# Patient Record
Sex: FEMALE | Race: WHITE | URBAN - METROPOLITAN AREA
[De-identification: names, ages, dates, MRNs, and addresses within clinical notes are randomized per-mention and may not be internally consistent; named-entity substitution may affect disease eponyms.]

---

## 2017-05-24 ENCOUNTER — HISTORICAL (OUTPATIENT)
Dept: ADMINISTRATIVE | Facility: HOSPITAL | Age: 57
End: 2017-05-24

## 2021-10-04 ENCOUNTER — HISTORICAL (OUTPATIENT)
Dept: ADMINISTRATIVE | Facility: HOSPITAL | Age: 61
End: 2021-10-04

## 2022-04-29 VITALS
SYSTOLIC BLOOD PRESSURE: 125 MMHG | BODY MASS INDEX: 35.08 KG/M2 | HEIGHT: 63 IN | SYSTOLIC BLOOD PRESSURE: 128 MMHG | WEIGHT: 199.06 LBS | HEIGHT: 63 IN | BODY MASS INDEX: 35.27 KG/M2 | DIASTOLIC BLOOD PRESSURE: 80 MMHG | DIASTOLIC BLOOD PRESSURE: 78 MMHG | WEIGHT: 198 LBS

## 2022-05-03 NOTE — HISTORICAL [facility]
This is a historical note converted from [employer]. Formatting and pictures may have been removed.  Please reference [facility] for original formatting and attached multimedia. Chief Complaint  1 YEAR S/P RIGHT PARTIAL KNEE REPLACEMENT. DOING VERY WELL.  History of Present Illness  1 year status post right partial knee arthroplasty  She is doing great, no complaints or issues with her right knee  Review of Systems  Systemic: No fever, no chills, and no recent weight change.  Head: No headache - frequent.  Eyes: No vision problems.  Otolarnygeal: No hearing loss, no earache, no epistaxis, no hoarseness, and no tooth pain. Gums normal.  Cardiovascular: No chest pain or discomfort and no palpitations.  Pulmonary: No pulmonary symptoms - difficulty sleeping, no dyspnea, and cough not worse in the morning.  Gastrointestinal: Appetite not decreased. No dysphagia and no constant eructation. No nausea, no vomiting, no abdominal pain, no hematochezia, and no loose/mushy stools - frequent. No constipation - frequent.  Genitourinary: No genitourinary symptoms - Getting up every night to urinate and no increase in urinary frequency. No urinary hesitancy. No urinary loss of control - difficulty stopping urination and no burning sensation during urination.  Musculoskeletal: No calf muscle cramps and no localized soft tissue swelling of the ankle.  Neurological: No fainting and no convulsions.  Psychological: Not feeling nervous tension, not feeling nervous from exhaustion, and no depression.  Skin: No rash. Previous history of no ulcers.  ?  ?  Physical Exam  Vitals & Measurements  BP:?128/80? HT:?160?cm? HT:?160?cm? WT:?90.3?kg? WT:?90.3?kg? BMI:?35.27?  Cardiovascular:  Arterial Pulses: ? Dorsalis pedis pulses were normal.  Musculoskeletal System:  Right Knee:  General: ? No swelling of the knee. ? No warmth of the knee. ? No pain was elicited by motion of the knee. ? No instability of the knee. ? Knees demonstrated no muscle  weakness.  Right Knee: ? Motion was abnormal.  Active flexion 130 degrees  Active extension 0 degrees  Neurological:  Sensation: ? Monofilament wire test of the leg/foot was normal.  Motor (Strength): ? No weakness of the ?Right knee was observed.  Skin:  ? No cellulitis. Surgical incision well healed ?  Tests  Imaging:  X-Ray Knee:  A complete knee x-ray with standing views was performed -of Right ?knee.  Impressions Radiology Test  X-ray of knee was performed intact Right knee implant?with pristine interfaces.  ?  ?  Assessment/Plan  Status post right partial knee replacement  ?She will continue with maintenance knee exercises as tolerated and follow up with us as needed.?Dr. Magana has examined patient agrees with plan   Problem List/Past Medical History  Anxiety  Arthritis  Colon polyps  Hypertension  Obesity  Status post right partial knee replacement  Historical  Anxiety  HTN (hypertension)  Procedure/Surgical History  ANSLEY BARCENAS Partial Knee Arthroplasty (Right) (05/31/2016), Replacement of Right Knee Joint with Synthetic Substitute, Cemented, Open Approach (05/31/2016), Robotic Assisted Procedure of Lower Extremity, Open Approach (05/31/2016), adenoidectomy, appendectomy, bilateral carpal tunnel sx, bunionectomy on rt, colonoscopy, cone biopsy of cervix, ORIF - Open reduction of fracture of ankle with internal fixation, tonsillectomy, Wrist.  Medications  clonazePAM 0.5 mg oral tablet  lisinopril 5 mg oral tablet, 5 mg, 1 tab(s), Oral, Daily  Multiple Vitamins oral tab, 1 tab(s), Oral, Daily  penicillin V potassium 500 mg oral tablet, See Instructions,? ?Not taking  Vitamin D 1,000 Units Tab, 1 tab(s), Oral, Daily  Allergies  No Known Allergies  Social History  Alcohol  Employment/School  Employed  Home/Environment  Lives with Spouse.  Substance Abuse - Denies Substance Abuse  Never  Tobacco - Denies Tobacco Use  Never smoker  Family History  Alcoholism.: Father.  COPD (chronic obstructive pulmonary disease).:  Father.  Depression.: Mother.  Diabetes mellitus type 1.: Mother.  Hyperlipidemia.: Mother.  Hypertension.: Mother.  Stroke: Mother.

## 2025-06-30 ENCOUNTER — OFFICE VISIT (OUTPATIENT)
Dept: ORTHOPEDICS | Facility: CLINIC | Age: 65
End: 2025-06-30
Payer: COMMERCIAL

## 2025-06-30 ENCOUNTER — HOSPITAL ENCOUNTER (OUTPATIENT)
Dept: RADIOLOGY | Facility: CLINIC | Age: 65
Discharge: HOME | End: 2025-06-30
Attending: PHYSICIAN ASSISTANT
Payer: COMMERCIAL

## 2025-06-30 VITALS — HEIGHT: 62 IN | WEIGHT: 213 LBS | BODY MASS INDEX: 39.2 KG/M2

## 2025-06-30 DIAGNOSIS — M25.551 BILATERAL HIP PAIN: ICD-10-CM

## 2025-06-30 DIAGNOSIS — M25.552 BILATERAL HIP PAIN: ICD-10-CM

## 2025-06-30 DIAGNOSIS — M16.11 PRIMARY OSTEOARTHRITIS OF RIGHT HIP: Primary | ICD-10-CM

## 2025-06-30 PROCEDURE — 3044F HG A1C LEVEL LT 7.0%: CPT | Mod: CPTII,,, | Performed by: PHYSICIAN ASSISTANT

## 2025-06-30 PROCEDURE — 1160F RVW MEDS BY RX/DR IN RCRD: CPT | Mod: CPTII,,, | Performed by: PHYSICIAN ASSISTANT

## 2025-06-30 PROCEDURE — 4010F ACE/ARB THERAPY RXD/TAKEN: CPT | Mod: CPTII,,, | Performed by: PHYSICIAN ASSISTANT

## 2025-06-30 PROCEDURE — 99202 OFFICE O/P NEW SF 15 MIN: CPT | Mod: ,,, | Performed by: PHYSICIAN ASSISTANT

## 2025-06-30 PROCEDURE — 3008F BODY MASS INDEX DOCD: CPT | Mod: CPTII,,, | Performed by: PHYSICIAN ASSISTANT

## 2025-06-30 PROCEDURE — 73521 X-RAY EXAM HIPS BI 2 VIEWS: CPT | Mod: ,,, | Performed by: PHYSICIAN ASSISTANT

## 2025-06-30 PROCEDURE — 1159F MED LIST DOCD IN RCRD: CPT | Mod: CPTII,,, | Performed by: PHYSICIAN ASSISTANT

## 2025-06-30 RX ORDER — PRAVASTATIN SODIUM 40 MG/1
40 TABLET ORAL DAILY
COMMUNITY
Start: 2021-10-04

## 2025-06-30 RX ORDER — ONDANSETRON 8 MG/1
8 TABLET, ORALLY DISINTEGRATING ORAL EVERY 8 HOURS PRN
COMMUNITY
Start: 2024-09-09

## 2025-06-30 RX ORDER — MELOXICAM 7.5 MG/1
7.5 TABLET ORAL NIGHTLY PRN
COMMUNITY

## 2025-06-30 RX ORDER — PANTOPRAZOLE SODIUM 40 MG/1
40 TABLET, DELAYED RELEASE ORAL DAILY
COMMUNITY
Start: 2025-04-15

## 2025-06-30 RX ORDER — DICLOFENAC SODIUM 75 MG/1
75 TABLET, DELAYED RELEASE ORAL 2 TIMES DAILY
Qty: 60 TABLET | Refills: 0 | Status: SHIPPED | OUTPATIENT
Start: 2025-06-30 | End: 2025-07-30

## 2025-06-30 RX ORDER — BISACODYL 5 MG/1
1 TABLET, COATED ORAL DAILY
COMMUNITY

## 2025-06-30 RX ORDER — CLONAZEPAM 0.5 MG/1
0.5 TABLET ORAL
COMMUNITY
Start: 2024-12-16

## 2025-06-30 RX ORDER — LISINOPRIL 10 MG/1
10 TABLET ORAL DAILY
COMMUNITY
Start: 2025-02-17

## 2025-06-30 RX ORDER — CYCLOBENZAPRINE HCL 10 MG
10 TABLET ORAL NIGHTLY PRN
COMMUNITY
Start: 2025-06-25

## 2025-06-30 NOTE — PROGRESS NOTES
Chief Complaint:   Chief Complaint   Patient presents with    Right Hip - Pain     Increase in bilateral hip pain x3-4 months. Denies prior injury. Pain worse with ROM. Taking Mobic and Aleve for pain with no relief. No prior sx. Previously had left hip inj to left inj. With relief. Not currently in PT.        History of present illness:      History of Present Illness    CHIEF COMPLAINT:  - Ms. Appiah presents for initial evaluation of bilateral hip pain.    HPI:  Ms. Appiah presents with bilateral hip pain that has been bothering her intermittently for years. Pain significantly worsened during a trip to [place] in October, where she had difficulty navigating stairs. Upon returning home, she continued her daily 30-minute treadmill walks but eventually had to stop due to severe pain. She reports scheduling an appointment in March or April but had to wait six weeks to be seen.    She reports that neither hip is worse than the other and states that nothing alleviates the pain. 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 In 2021, she received a cortisone injection for her hip, which was beneficial as she was walking excessively at the mall.    On May 26th, she developed a shoulder problem, for which she consulted her GP. The GP diagnosed it as muscle-related and prescribed double Meloxicam for three weeks, along with a steroid injection and a pain shot, which provided temporary relief. However, she reports her arm still lacks strength.    She had a partial knee replacement in 2016. She and her  have upcoming travel plans, including a cruise in October and a trip to [place] in October 2026.    PREVIOUS TREATMENTS:  - Ms. Appiah received a steroid injection and a pain shot from their GP for shoulder problem on May 26, which provided relief for about a day.  - Ms. Appiah received a cortisone injection in 2021 for hip pain, which allowed the patient to walk at the mall.  - Ms. Appiah  doubled Meloxicam dosage for 3 weeks as prescribed by GP, which provided significant benefit.    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 No pertinent past medical history.    Past Surgical History:   Procedure Laterality Date    ADENOIDECTOMY  1968    APPENDECTOMY  1969    BUNIONECTOMY      JOINT REPLACEMENT  2016    SURGICAL REMOVAL OF BONE SPUR      TONSILLECTOMY  1968       Current Outpatient Medications   Medication Sig    clonazePAM (KLONOPIN) 0.5 MG tablet Take 0.5 mg by mouth 2 (two) times daily.    cyclobenzaprine (FLEXERIL) 10 MG tablet Take 10 mg by mouth nightly as needed.    lisinopriL 10 MG tablet Take 10 mg by mouth.    meloxicam (MOBIC) 7.5 MG tablet Take 7.5 mg by mouth nightly as needed.    multivitamin-minerals-lutein (MULTIVITAMIN 50 PLUS) Tab Take by mouth.    ondansetron (ZOFRAN-ODT) 8 MG TbDL Take 8 mg by mouth every 8 (eight) hours as needed.    pantoprazole (PROTONIX) 40 MG tablet Take 40 mg by mouth.    pravastatin (PRAVACHOL) 40 MG tablet Take 40 mg by mouth.    vitamin D3-folic acid 125 mcg (5,000 unit)-1 mg Tab Take 1 tablet by mouth.    diclofenac (VOLTAREN) 75 MG EC tablet Take 1 tablet (75 mg total) by mouth 2 (two) times daily.     No current facility-administered medications for this visit.       Review of patient's allergies indicates:  No Known Allergies    Family History   Problem Relation Name Age of Onset    Depression Mother Marlen Miller     Diabetes Mother Marlen Miller     Hypertension Mother Marlen Miller     Stroke Mother Marlen Miller     Alcohol abuse Father Sophy Miller     Drug abuse Brother Little Brother     Stroke Brother Big Brother        Social History[1]      Review of  "Systems:    Constitution:   Denies chills, fever, and sweats.  HENT:   Denies headaches or blurry vision.  Cardiovascular:  Denies chest pain or irregular heart beat.  Respiratory:   Denies cough or shortness of breath.  Gastrointestinal:  Denies abdominal pain, nausea, or vomiting.  Musculoskeletal:   Denies muscle cramps.  Neurological:   Denies dizziness or focal weakness.  Psychiatric/Behavior: Normal mental status.  Hematology/Lymph:  Denies bleeding problem or easy bruising/bleeding.  Skin:    Denies rash or suspicious lesions.    Examination:    Vital Signs:    Vitals:    06/30/25 1451   Weight: 96.6 kg (213 lb)   Height: 5' 2" (1.575 m)       Body mass index is 38.96 kg/m².    Constitution:   Well-developed, well nourished patient in no acute distress.  Neurological:   Alert and oriented x 3 and cooperative to examination.     Psychiatric/Behavior: Normal mental status.  Respiratory:   No shortness of breath.  Nonlabored breathing  Cardiovascular:           Regular rate and rhythm  Eyes:    Extraoccular muscles intact  Skin:    No scars, rash or suspicious lesions.    Physical Exam:     Right hip exam     No signs of edema, erythema, or induration.    Tender over the greater trochanteric region.    Pain with internal and external rotation    Passive hip abduction 30 degrees   Passive hip flexion 90 degrees   Passive internal rotation 10 degrees   Passive external rotation 45 degrees   No weakness of the left hip was observed. An antalgic gait was observed. limping was noted     xrays    Right hip x-ray with two or more views of the AP and lateral aspects were performed.   Impressions   Advanced Joint space narrowing with osteophytes arising from the hip joint and subchondral cysts seen.  She does have mild degenerative changes seen in the left hip joint.        Assessment:     Primary osteoarthritis of right hip  -     X-Ray Hips Bilateral 2 View Inc AP Pelvis; Future; Expected date: 06/30/2025    Other " orders  -     diclofenac (VOLTAREN) 75 MG EC tablet; Take 1 tablet (75 mg total) by mouth 2 (two) times daily.  Dispense: 60 tablet; Refill: 0        Plan:      Assessment & Plan    PROCEDURES:  I have discussed exam and x-ray findings with the patient today.  She has advanced osteoarthritis of the right hip.  Conservative treatment would consist of fluoroscopic guided intra-articular corticosteroid injection, physical therapy, activity modification and medication.  Definitive treatment would consist of robotic assisted right total hip arthroplasty.  Currently she is interested in surgical intervention and would like to get this done prior to a cruise to [place] in October.  She would like to be recovered by that timeframe.  I have told her that Dr. Magana schedule would not allow that but I can speak to 1 of his partners to see if there is availability on their surgery schedule to accommodate her timeframe.  If not then we have discussed fluoroscopic guided right hip intra-articular corticosteroid injection with no sedation for temporary pain relief and she can look at scheduling the operation following her trip to [place] in October.  - Recommend PT for conservative treatment to strengthen muscles and stretch hip capsule.              No follow-ups on file.    This note was generated with the assistance of ambient listening technology. Verbal consent was obtained by the patient and accompanying visitor(s) for the recording of patient appointment to facilitate this note. I attest to having reviewed and edited the generated note for accuracy, though some syntax or spelling errors may persist. Please contact the author of this note for any clarification.       DISCLAIMER: This note may have been dictated using voice recognition software and may contain grammatical errors.          [1]   Social History  Socioeconomic History    Marital status:    Tobacco Use    Smoking status: Never    Smokeless tobacco: Never    Substance and Sexual Activity    Alcohol use: Not Currently     Comment: Rarely    Drug use: Never    Sexual activity: Not Currently     Partners: Male     Birth control/protection: Post-menopausal     Social Drivers of Health     Financial Resource Strain: Low Risk  (3/7/2025)    [facility]    Overall Financial Resource Strain (CARDIA)     Difficulty of Paying Living Expenses: Not hard at all   Food Insecurity: No Food Insecurity (3/7/2025)    [facility]    Hunger Vital Sign     Worried About Running Out of Food in the Last Year: Never true     Ran Out of Food in the Last Year: Never true   Transportation Needs: No Transportation Needs (3/7/2025)    [facility]    PRAPARE - Transportation     Lack of Transportation (Medical): No     Lack of Transportation (Non-Medical): No   Physical Activity: Sufficiently Active (3/7/2025)    [facility]    Exercise Vital Sign     Days of Exercise per Week: 5 days     Minutes of Exercise per Session: 30 min   Stress: Stress Concern Present (3/7/2025)    Received from [facility] and Affiliates    [employer] [place] [employer] - Occupational Stress Questionnaire     Feeling of Stress : Rather much   Housing Stability: Low Risk  (3/7/2025)    Received from [facility] and Affiliates    Housing Stability Vital Sign     Unable to Pay for Housing in the Last Year: No     Number of Times Moved in the Last Year: 0     Homeless in the Last Year: No

## 2025-06-30 NOTE — H&P (VIEW-ONLY)
Chief Complaint:   Chief Complaint   Patient presents with    Right Hip - Pain     Increase in bilateral hip pain x3-4 months. Denies prior injury. Pain worse with ROM. Taking Mobic and Aleve for pain with no relief. No prior sx. Previously had left hip inj to left inj. With relief. Not currently in PT.        History of present illness:      History of Present Illness    CHIEF COMPLAINT:  - Ms. Appiah presents for initial evaluation of bilateral hip pain.    HPI:  Ms. Appiah presents with bilateral hip pain that has been bothering her intermittently for years. Pain significantly worsened during a trip to [place] in October, where she had difficulty navigating stairs. Upon returning home, she continued her daily 30-minute treadmill walks but eventually had to stop due to severe pain. She reports scheduling an appointment in March or April but had to wait six weeks to be seen.    She reports that neither hip is worse than the other and states that nothing alleviates the pain. 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 In 2021, she received a cortisone injection for her hip, which was beneficial as she was [place] the mall.    On May 26th, she developed a shoulder problem, for which she consulted her GP. The GP diagnosed it as muscle-related and prescribed double Meloxicam for three weeks, along with a steroid injection and a pain shot, which provided temporary relief. However, she reports her arm still lacks strength.    She had a partial knee replacement in 2016. She and her  have upcoming travel plans, including a cruise in October and a trip to [place] in October 2026.    PREVIOUS TREATMENTS:  - Ms. Appiah received a steroid injection and a pain shot from their GP for shoulder problem on May 26, which provided relief for about a day.  - Ms. Appiah received a cortisone injection in 2021 for hip pain, which allowed the patient to walk at the mall.  - Ms. Appiah  doubled Meloxicam dosage for 3 weeks as prescribed by GP, which provided significant benefit.    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 No pertinent past medical history.    Past Surgical History:   Procedure Laterality Date    ADENOIDECTOMY  1968    APPENDECTOMY  1969    BUNIONECTOMY      JOINT REPLACEMENT  2016    SURGICAL REMOVAL OF BONE SPUR      TONSILLECTOMY  1968       Current Outpatient Medications   Medication Sig    clonazePAM (KLONOPIN) 0.5 MG tablet Take 0.5 mg by mouth 2 (two) times daily.    cyclobenzaprine (FLEXERIL) 10 MG tablet Take 10 mg by mouth nightly as needed.    lisinopriL 10 MG tablet Take 10 mg by mouth.    meloxicam (MOBIC) 7.5 MG tablet Take 7.5 mg by mouth nightly as needed.    multivitamin-minerals-lutein (MULTIVITAMIN 50 PLUS) Tab Take by mouth.    ondansetron (ZOFRAN-ODT) 8 MG TbDL Take 8 mg by mouth every 8 (eight) hours as needed.    pantoprazole (PROTONIX) 40 MG tablet Take 40 mg by mouth.    pravastatin (PRAVACHOL) 40 MG tablet Take 40 mg by mouth.    vitamin D3-folic acid 125 mcg (5,000 unit)-1 mg Tab Take 1 tablet by mouth.    diclofenac (VOLTAREN) 75 MG EC tablet Take 1 tablet (75 mg total) by mouth 2 (two) times daily.     No current facility-administered medications for this visit.       Review of patient's allergies indicates:  No Known Allergies    Family History   Problem Relation Name Age of Onset    Depression Mother Marlen Miller     Diabetes Mother Marlen Miller     Hypertension Mother Marlen Miller     Stroke Mother Marlen Miller     Alcohol abuse Father Sophy Miller     Drug abuse Brother Little Brother     Stroke Brother Big Brother        Social History[1]      Review of  "Systems:    Constitution:   Denies chills, fever, and sweats.  HENT:   Denies headaches or blurry vision.  Cardiovascular:  Denies chest pain or irregular heart beat.  Respiratory:   Denies cough or shortness of breath.  Gastrointestinal:  Denies abdominal pain, nausea, or vomiting.  Musculoskeletal:   Denies muscle cramps.  Neurological:   Denies dizziness or focal weakness.  Psychiatric/Behavior: Normal mental status.  Hematology/Lymph:  Denies bleeding problem or easy bruising/bleeding.  Skin:    Denies rash or suspicious lesions.    Examination:    Vital Signs:    Vitals:    06/30/25 1451   Weight: 96.6 kg (213 lb)   Height: 5' 2" (1.575 m)       Body mass index is 38.96 kg/m².    Constitution:   Well-developed, well nourished patient in no acute distress.  Neurological:   Alert and oriented x 3 and cooperative to examination.     Psychiatric/Behavior: Normal mental status.  Respiratory:   No shortness of breath.  Nonlabored breathing  Cardiovascular:           Regular rate and rhythm  Eyes:    Extraoccular muscles intact  Skin:    No scars, rash or suspicious lesions.    Physical Exam:     Right hip exam     No signs of edema, erythema, or induration.    Tender over the greater trochanteric region.    Pain with internal and external rotation    Passive hip abduction 30 degrees   Passive hip flexion 90 degrees   Passive internal rotation 10 degrees   Passive external rotation 45 degrees   No weakness of the left hip was observed. An antalgic gait was observed. limping was noted     xrays    Right hip x-ray with two or more views of the AP and lateral aspects were performed.   Impressions   Advanced Joint space narrowing with osteophytes arising from the hip joint and subchondral cysts seen.  She does have mild degenerative changes seen in the left hip joint.        Assessment:     Primary osteoarthritis of right hip  -     X-Ray Hips Bilateral 2 View Inc AP Pelvis; Future; Expected date: 06/30/2025    Other " orders  -     diclofenac (VOLTAREN) 75 MG EC tablet; Take 1 tablet (75 mg total) by mouth 2 (two) times daily.  Dispense: 60 tablet; Refill: 0        Plan:      Assessment & Plan    PROCEDURES:  I have discussed exam and x-ray findings with the patient today.  She has advanced osteoarthritis of the right hip.  Conservative treatment would consist of fluoroscopic guided intra-articular corticosteroid injection, physical therapy, activity modification and medication.  Definitive treatment would consist of robotic assisted right total hip arthroplasty.  Currently she is interested in surgical intervention and would like to get this done prior to a cruise to [place] in October.  She would like to be recovered by that timeframe.  I have told her that Dr. Magana schedule would not allow that but I can speak to 1 of his partners to see if there is availability on their surgery schedule to accommodate her timeframe.  If not then we have discussed fluoroscopic guided right hip intra-articular corticosteroid injection with no sedation for temporary pain relief and she can look at scheduling the operation following her trip to [place] in October.  - Recommend PT for conservative treatment to strengthen muscles and stretch hip capsule.              No follow-ups on file.    This note was generated with the assistance of ambient listening technology. Verbal consent was obtained by the patient and accompanying visitor(s) for the recording of patient appointment to facilitate this note. I attest to having reviewed and edited the generated note for accuracy, though some syntax or spelling errors may persist. Please contact the author of this note for any clarification.       DISCLAIMER: This note may have been dictated using voice recognition software and may contain grammatical errors.          [1]   Social History  Socioeconomic History    Marital status:    Tobacco Use    Smoking status: Never    Smokeless tobacco: Never    Substance and Sexual Activity    Alcohol use: Not Currently     Comment: Rarely    Drug use: Never    Sexual activity: Not Currently     Partners: Male     Birth control/protection: Post-menopausal     Social Drivers of Health     Financial Resource Strain: Low Risk  (3/7/2025)    [facility]    Overall Financial Resource Strain (CARDIA)     Difficulty of Paying Living Expenses: Not hard at all   Food Insecurity: No Food Insecurity (3/7/2025)    [facility]    Hunger Vital Sign     Worried About Running Out of Food in the Last Year: Never true     Ran Out of Food in the Last Year: Never true   Transportation Needs: No Transportation Needs (3/7/2025)    [facility]    PRAPARE - Transportation     Lack of Transportation (Medical): No     Lack of Transportation (Non-Medical): No   Physical Activity: Sufficiently Active (3/7/2025)    [facility]    Exercise Vital Sign     Days of Exercise per Week: 5 days     Minutes of Exercise per Session: 30 min   Stress: Stress Concern Present (3/7/2025)    Received from [facility] and Its Sub[facility]idiaries and Affiliates    [employer] [place] [employer] - Occupational Stress Questionnaire     Feeling of Stress : Rather much   Housing Stability: Low Risk  (3/7/2025)    Received from [facility] and Affiliates    Housing Stability Vital Sign     Unable to Pay for Housing in the Last Year: No     Number of Times Moved in the Last Year: 0     Homeless in the Last Year: No

## 2025-07-01 ENCOUNTER — TELEPHONE (OUTPATIENT)
Dept: ORTHOPEDICS | Facility: CLINIC | Age: 65
End: 2025-07-01
Payer: COMMERCIAL

## 2025-07-01 NOTE — TELEPHONE ENCOUNTER
Called pt per [facility] to inform her that Dr. Carr's sx schedule is booking into late August, so this will not give her enough time to recover before her trip to [place] in October.     Spoke with pt and inquired on if she would like to proceed with RT hip IA inj to see if this will help with her pain.     RT hip IA inj NO sed sx scheduled for 7/15/25 and informed pt that consents will be signed the morning of sx. Also informed that they will call her the day before sx for time of arrival.     Informed that if this does help with her pain, we can schedule another inj prior to her trip in October and then when she gets back, we can get her on the books for RT ANTWAN.     Informed to call with any questions or concerns, she verbalized a clear understanding.

## 2025-07-15 ENCOUNTER — HOSPITAL ENCOUNTER (OUTPATIENT)
Facility: HOSPITAL | Age: 65
Discharge: HOME | End: 2025-07-15
Attending: SPECIALIST | Admitting: SPECIALIST
Payer: COMMERCIAL

## 2025-07-15 DIAGNOSIS — M16.11 PRIMARY OSTEOARTHRITIS OF RIGHT HIP: ICD-10-CM

## 2025-07-15 PROCEDURE — 71000015 HC POSTOP RECOV 1ST HR: Performed by: SPECIALIST

## 2025-07-15 PROCEDURE — 63600175 PHARM REV CODE 636 W HCPCS: Performed by: SPECIALIST

## 2025-07-15 PROCEDURE — 20610 DRAIN/INJ JOINT/BURSA W/O US: CPT | Mod: RT,,, | Performed by: SPECIALIST

## 2025-07-15 PROCEDURE — 36000704 HC OR TIME LEV I 1ST 15 MIN: Performed by: SPECIALIST

## 2025-07-15 PROCEDURE — 77002 NEEDLE LOCALIZATION BY XRAY: CPT | Mod: 26,,, | Performed by: SPECIALIST

## 2025-07-15 PROCEDURE — 20610 DRAIN/INJ JOINT/BURSA W/O US: CPT | Performed by: SPECIALIST

## 2025-07-15 RX ORDER — BETAMETHASONE SODIUM PHOSPHATE AND BETAMETHASONE ACETATE 3; 3 MG/ML; MG/ML
INJECTION, SUSPENSION INTRA-ARTICULAR; INTRALESIONAL; INTRAMUSCULAR; SOFT TISSUE
Status: DISCONTINUED
Start: 2025-07-15 | End: 2025-07-15 | Stop reason: HOSPADM

## 2025-07-15 RX ORDER — BETAMETHASONE SODIUM PHOSPHATE AND BETAMETHASONE ACETATE 3; 3 MG/ML; MG/ML
INJECTION, SUSPENSION INTRA-ARTICULAR; INTRALESIONAL; INTRAMUSCULAR; SOFT TISSUE
Status: DISCONTINUED | OUTPATIENT
Start: 2025-07-15 | End: 2025-07-15 | Stop reason: HOSPADM

## 2025-07-15 RX ORDER — LIDOCAINE HYDROCHLORIDE 10 MG/ML
INJECTION, SOLUTION EPIDURAL; INFILTRATION; INTRACAUDAL; PERINEURAL
Status: DISCONTINUED | OUTPATIENT
Start: 2025-07-15 | End: 2025-07-15 | Stop reason: HOSPADM

## 2025-07-15 RX ORDER — LIDOCAINE HYDROCHLORIDE 10 MG/ML
INJECTION, SOLUTION INFILTRATION; PERINEURAL
Status: DISCONTINUED
Start: 2025-07-15 | End: 2025-07-15 | Stop reason: HOSPADM

## 2025-07-15 RX ORDER — MUPIROCIN 20 MG/G
OINTMENT TOPICAL 2 TIMES DAILY
Status: CANCELLED | OUTPATIENT
Start: 2025-07-15 | End: 2025-07-20

## 2025-07-15 NOTE — DISCHARGE SUMMARY
[facility] Orthopaedics - Periop Services  Discharge Note  Short Stay    Procedure(s) (LRB):  Injection, Joint, Shoulder, Hip, Or Knee (Right)      OUTCOME: Patient tolerated treatment/procedure well without complication and is now ready for discharge.    DISPOSITION: Home or Self Care    FINAL DIAGNOSIS:  <principal problem not specified>    FOLLOWUP: None    DISCHARGE INSTRUCTIONS:    Discharge Procedure Orders   Ice to affected area     Activity as tolerated        TIME SPENT ON DISCHARGE: 5 minutes

## 2025-07-15 NOTE — OP NOTE
[facility] Orthopaedics - Periop Services  General Surgery  Operative Note    SUMMARY     Date of Procedure: 7/15/2025     Procedure: Procedure(s) (LRB):  Injection, Joint, Shoulder, Hip, Or Knee (Right)   right hip fluoroscopic guided intra-articular corticosteroid injection with no anesthesia    Surgeons and Role:     * Zaire Magana MD - Primary    Assisting Surgeon: None     Pre-Operative Diagnosis: Primary osteoarthritis of right hip [M16.11]    Post-Operative Diagnosis: Post-Op Diagnosis Codes:     * Primary osteoarthritis of right hip [M16.11]    Anesthesia: Local    Operative Findings (including complications, if any):  Osteoarthritis right hip    Description of Technical Procedures:  Patient is a 64-year-old female with osteoarthritis of the right hip who elected undergo a right hip fluoroscopic guided intra-articular corticosteroid injection with no anesthesia.  Risks, benefits, alternatives, and complications of operative and nonoperative treatment were explained.  She understood, agreed, and wanted to proceed with the operation.  Valid informed consent was obtained.  She was brought to the operating room placed supine on operating table and underwent positioning.  She was secured on the operating room table and then the usual sterile ChloraPrep scrub and paint followed by sterile draping was performed of the anterior aspect of the right hip.  Utilizing fluoroscopy the anterior injection site was identified and anesthetized with lidocaine.  I then advanced an 18 gauge spinal needle under fluoroscopic guidance into the right hip joint.  Clear viscous synovial fluid was aspirated.  Approximately 3 mL of synovial fluid had been aspirated.  Position was confirmed radiographically.  I then injected 2 cc of corticosteroid and 2 cc of lidocaine into the right hip joint.  Patient tolerated procedure well.  There were no complications.  A Band-Aid was applied.  She will be discharged home weight-bearing as  tolerated and activity as tolerated with follow up PRN      Estimated Blood Loss (EBL): * No values recorded between 7/15/2025 12:00 AM and 7/15/2025  6:46 AM *           Implants: * No implants in log *    Specimens:   Specimen (24h ago, onward)      None                    Condition: Good    Disposition: PACU - hemodynamically stable.

## 2025-07-16 VITALS
OXYGEN SATURATION: 96 % | DIASTOLIC BLOOD PRESSURE: 97 MMHG | WEIGHT: 213.63 LBS | SYSTOLIC BLOOD PRESSURE: 147 MMHG | TEMPERATURE: 95 F | HEIGHT: 63 IN | HEART RATE: 81 BPM | RESPIRATION RATE: 20 BRPM | BODY MASS INDEX: 37.85 KG/M2

## 2025-07-22 ENCOUNTER — PATIENT MESSAGE (OUTPATIENT)
Dept: ORTHOPEDICS | Facility: CLINIC | Age: 65
End: 2025-07-22
Payer: COMMERCIAL

## 2025-07-23 ENCOUNTER — OFFICE VISIT (OUTPATIENT)
Dept: ORTHOPEDICS | Facility: CLINIC | Age: 65
End: 2025-07-23
Payer: COMMERCIAL

## 2025-07-23 VITALS
WEIGHT: 213.63 LBS | HEART RATE: 83 BPM | BODY MASS INDEX: 37.85 KG/M2 | HEIGHT: 63 IN | SYSTOLIC BLOOD PRESSURE: 135 MMHG | DIASTOLIC BLOOD PRESSURE: 83 MMHG

## 2025-07-23 DIAGNOSIS — M16.11 PRIMARY OSTEOARTHRITIS OF RIGHT HIP: Primary | ICD-10-CM

## 2025-07-23 PROCEDURE — 1160F RVW MEDS BY RX/DR IN RCRD: CPT | Mod: CPTII,,, | Performed by: PHYSICIAN ASSISTANT

## 2025-07-23 PROCEDURE — 1159F MED LIST DOCD IN RCRD: CPT | Mod: CPTII,,, | Performed by: PHYSICIAN ASSISTANT

## 2025-07-23 PROCEDURE — 99213 OFFICE O/P EST LOW 20 MIN: CPT | Mod: ,,, | Performed by: PHYSICIAN ASSISTANT

## 2025-07-23 PROCEDURE — 3075F SYST BP GE 130 - 139MM HG: CPT | Mod: CPTII,,, | Performed by: PHYSICIAN ASSISTANT

## 2025-07-23 PROCEDURE — 3044F HG A1C LEVEL LT 7.0%: CPT | Mod: CPTII,,, | Performed by: PHYSICIAN ASSISTANT

## 2025-07-23 PROCEDURE — 3079F DIAST BP 80-89 MM HG: CPT | Mod: CPTII,,, | Performed by: PHYSICIAN ASSISTANT

## 2025-07-23 PROCEDURE — 4010F ACE/ARB THERAPY RXD/TAKEN: CPT | Mod: CPTII,,, | Performed by: PHYSICIAN ASSISTANT

## 2025-07-23 PROCEDURE — 3008F BODY MASS INDEX DOCD: CPT | Mod: CPTII,,, | Performed by: PHYSICIAN ASSISTANT

## 2025-07-23 RX ORDER — METHYLPREDNISOLONE 4 MG/1
TABLET ORAL
Qty: 21 EACH | Refills: 0 | Status: SHIPPED | OUTPATIENT
Start: 2025-07-23

## 2025-07-23 RX ORDER — DICLOFENAC SODIUM 75 MG/1
75 TABLET, DELAYED RELEASE ORAL 2 TIMES DAILY
Qty: 60 TABLET | Refills: 0 | Status: SHIPPED | OUTPATIENT
Start: 2025-07-23 | End: 2025-08-22

## 2025-07-23 NOTE — PROGRESS NOTES
Chief Complaint:   Chief Complaint   Patient presents with    Right Hip - Post-op Evaluation     1 week sp from RT hip IA inj NO sed SX 7/15/25, having some problems- shooting pain from hip through the knee everytime she takes a deep breath and knee wasn't hurting before, shocking pain through her calf and knee        History of present illness:    64-year-old female presents office today for follow-up on her right hip.  She has a known history of advanced osteoarthritis of the right hip and underwent a fluoroscopic guided intra-articular injection of steroid one-week ago.  She noted some shooting pain down the anterior thigh into the lower leg following that injection.  She notes some numbness and tingling as well.  She denies any history of injury.  She is currently working with physical therapy.  She states that the injection did help the following day with her groin pain    Past Medical History:   Diagnosis Date    Arthritis     Digestive disorder     High cholesterol     Hypertension     Sleep apnea     cpap       Past Surgical History:   Procedure Laterality Date    ADENOIDECTOMY  1968    APPENDECTOMY  1969    BUNIONECTOMY      COLONOSCOPY      INJECTION OF JOINT Right 7/15/2025    Procedure: Injection, Joint, Shoulder, Hip, Or Knee;  Surgeon: Zaire Magana MD;  Location: [facility];  Service: Orthopedics;  Laterality: Right;  Right hip intra-articular corticosteroid injection under fluoroscopy with no sedation    SURGICAL REMOVAL OF BONE SPUR  2019    TONSILLECTOMY  1968    TOTAL KNEE ARTHROPLASTY Right 2016    partial       Current Outpatient Medications   Medication Sig    clonazePAM (KLONOPIN) 0.5 MG tablet Take 0.5 mg by mouth as needed.    cyclobenzaprine (FLEXERIL) 10 MG tablet Take 10 mg by mouth nightly as needed.    diclofenac (VOLTAREN) 75 MG EC tablet Take 1 tablet (75 mg total) by mouth 2 (two) times daily.    lisinopriL 10 MG tablet Take 10 mg by mouth once daily.    multivitamin-minerals-lutein  "(MULTIVITAMIN 50 PLUS) Tab Take 1 tablet by mouth once daily.    ondansetron (ZOFRAN-ODT) 8 MG TbDL Take 8 mg by mouth every 8 (eight) hours as needed.    pantoprazole (PROTONIX) 40 MG tablet Take 40 mg by mouth once daily.    pravastatin (PRAVACHOL) 40 MG tablet Take 40 mg by mouth once daily.    vitamin D3-folic acid 125 mcg (5,000 unit)-1 mg Tab Take 1 tablet by mouth once daily.    diclofenac (VOLTAREN) 75 MG EC tablet Take 1 tablet (75 mg total) by mouth 2 (two) times daily.    meloxicam (MOBIC) 7.5 MG tablet Take 7.5 mg by mouth nightly as needed. (Patient not taking: Reported on 7/23/2025)    methylPREDNISolone (MEDROL DOSEPACK) 4 mg tablet use as directed    NON FORMULARY MEDICATION Take 1 tablet by mouth Daily. (Patient not taking: Reported on 7/23/2025)     No current facility-administered medications for this visit.       Review of patient's allergies indicates:  No Known Allergies    Family History   Problem Relation Name Age of Onset    Depression Mother Marlen Miller     Diabetes Mother Marlen Miller     Hypertension Mother Marlen Miller     Stroke Mother Marlen Miller     Alcohol abuse Father Sophy Miller     Drug abuse Brother Little Brother     Stroke Brother Big Brother        Social History[1]      Review of Systems:    Constitution:   Denies chills, fever, and sweats.  HENT:   Denies headaches or blurry vision.  Cardiovascular:  Denies chest pain or irregular heart beat.  Respiratory:   Denies cough or shortness of breath.  Gastrointestinal:  Denies abdominal pain, nausea, or vomiting.  Musculoskeletal:   Denies muscle cramps.  Neurological:   Denies dizziness or focal weakness.  Psychiatric/Behavior: Normal mental status.  Hematology/Lymph:  Denies bleeding problem or easy bruising/bleeding.  Skin:    Denies rash or suspicious lesions.    Examination:    Vital Signs:    Vitals:    07/23/25 1504   BP: 135/83   Pulse: 83   Weight: 96.9 kg (213 lb 10 oz)   Height: 5' 3" (1.6 m)       Body mass " index is 37.84 kg/m².    Constitution:   Well-developed, well nourished patient in no acute distress.  Neurological:   Alert and oriented x 3 and cooperative to examination.     Psychiatric/Behavior: Normal mental status.  Respiratory:   No shortness of breath.  Nonlabored breathing  Cardiovascular:           Regular rate and rhythm  Eyes:    Extraoccular muscles intact  Skin:    No scars, rash or suspicious lesions.    Physical Exam:       Right hip exam     No signs of edema, erythema, or induration.    Tender over the greater trochanteric region.    Pain with internal and external rotation    Passive hip abduction 30 degrees   Passive hip flexion 90 degrees   Passive internal rotation 10 degrees   Passive external rotation 45 degrees   No weakness of the left hip was observed. An antalgic gait was observed. limping was noted     xrays    Right hip x-ray with two or more views of the AP and lateral aspects were reviewed.   Impressions   Advanced Joint space narrowing with osteophytes arising from the hip joint and subchondral cysts seen.  She does have mild degenerative changes seen in the left hip joint.  Moderate to advanced disc disease seen in the lumbar spine        Assessment:     Primary osteoarthritis of right hip    Other orders  -     methylPREDNISolone (MEDROL DOSEPACK) 4 mg tablet; use as directed  Dispense: 21 each; Refill: 0  -     diclofenac (VOLTAREN) 75 MG EC tablet; Take 1 tablet (75 mg total) by mouth 2 (two) times daily.  Dispense: 60 tablet; Refill: 0        Plan:      I have discussed exam findings with the patient today.  I am concerned for some nerve irritation from the lumbar spine.  She has no sensory deficits nor any motor weakness but complaining of numbness and tingling down the anterior thigh and leg.  I will start her on a Medrol Dosepak to be followed by oral diclofenac twice daily.  She will continue with the physical therapy.  We have discussed further evaluation of the lumbar  spine if symptoms persist.  She will follow up as needed        No follow-ups on file.    DISCLAIMER: This note may have been dictated using voice recognition software and may contain grammatical errors.          [1]   Social History  Socioeconomic History    Marital status:    Tobacco Use    Smoking status: Never    Smokeless tobacco: Never   Substance and Sexual Activity    Alcohol use: Yes     Comment: Rarely    Drug use: Never    Sexual activity: Not Currently     Partners: Male     Birth control/protection: Post-menopausal     Social Drivers of Health     Financial Resource Strain: Low Risk  (3/7/2025)    Received from [facility] Its Subsidiaries and Affiliates    Overall Financial Resource Strain (CARDIA)     Difficulty of Paying Living Expenses: Not hard at all   Food Insecurity: No Food Insecurity (3/7/2025)    Received from [facility] and Its Subsidiaries and Affiliates    Hunger Vital Sign     Worried About Running Out of Food in the Last Year: Never true     Ran Out of Food in the Last Year: Never true   Transportation Needs: No Transportation Needs (3/7/2025)    [facility]    PRAPARE - Transportation     Lack of Transportation (Medical): No     Lack of Transportation (Non-Medical): No   Physical Activity: Sufficiently Active (3/7/2025)    Received from [facility] Its Subsidiaries and Affiliates    Exercise Vital Sign     Days of Exercise per Week: 5 days     Minutes of Exercise per Session: 30 min   Stress: Stress Concern Present (3/7/2025)    Received from [facility] Its Subsidiaries and Affiliates    [employer] [place] [employer] - Occupational Stress Questionnaire     Feeling of Stress : Rather much   Housing Stability: Low Risk   (3/7/2025)    Received from [facility] and Its Subsidiaries and Affiliates    Housing Stability Vital Sign     Unable to Pay for Housing in the Last Year: No     Number of Times Moved in the Last Year: 0     Homeless in the Last Year: No

## 2025-08-26 RX ORDER — DICLOFENAC SODIUM 75 MG/1
75 TABLET, DELAYED RELEASE ORAL 2 TIMES DAILY
Qty: 60 TABLET | Refills: 0 | Status: SHIPPED | OUTPATIENT
Start: 2025-08-26

## 2025-08-27 ENCOUNTER — OFFICE VISIT (OUTPATIENT)
Dept: ORTHOPEDICS | Facility: CLINIC | Age: 65
End: 2025-08-27
Payer: COMMERCIAL

## 2025-08-27 VITALS
HEIGHT: 63 IN | WEIGHT: 213.63 LBS | BODY MASS INDEX: 37.85 KG/M2 | HEART RATE: 110 BPM | DIASTOLIC BLOOD PRESSURE: 75 MMHG | SYSTOLIC BLOOD PRESSURE: 111 MMHG

## 2025-08-27 DIAGNOSIS — M75.121 NONTRAUMATIC COMPLETE TEAR OF RIGHT ROTATOR CUFF: Primary | ICD-10-CM

## 2025-08-27 PROCEDURE — 1160F RVW MEDS BY RX/DR IN RCRD: CPT | Mod: CPTII,,, | Performed by: PHYSICIAN ASSISTANT

## 2025-08-27 PROCEDURE — 3008F BODY MASS INDEX DOCD: CPT | Mod: CPTII,,, | Performed by: PHYSICIAN ASSISTANT

## 2025-08-27 PROCEDURE — 3044F HG A1C LEVEL LT 7.0%: CPT | Mod: CPTII,,, | Performed by: PHYSICIAN ASSISTANT

## 2025-08-27 PROCEDURE — 3078F DIAST BP <80 MM HG: CPT | Mod: CPTII,,, | Performed by: PHYSICIAN ASSISTANT

## 2025-08-27 PROCEDURE — 4010F ACE/ARB THERAPY RXD/TAKEN: CPT | Mod: CPTII,,, | Performed by: PHYSICIAN ASSISTANT

## 2025-08-27 PROCEDURE — 1159F MED LIST DOCD IN RCRD: CPT | Mod: CPTII,,, | Performed by: PHYSICIAN ASSISTANT

## 2025-08-27 PROCEDURE — 99213 OFFICE O/P EST LOW 20 MIN: CPT | Mod: ,,, | Performed by: PHYSICIAN ASSISTANT

## 2025-08-27 PROCEDURE — 3074F SYST BP LT 130 MM HG: CPT | Mod: CPTII,,, | Performed by: PHYSICIAN ASSISTANT

## (undated) DEVICE — GLOVE PROTEXIS HYDROGEL SZ8

## (undated) DEVICE — CONTAINER SPECIMEN SCREW 4OZ

## (undated) DEVICE — KIT SURGICAL TURNOVER

## (undated) DEVICE — Device

## (undated) DEVICE — APPLICATOR CHLORAPREP ORN 26ML

## (undated) DEVICE — TOWEL OR DISP STRL BLUE 4/PK

## (undated) DEVICE — NDL ANES SPINAL 18X3.5ST 18G

## (undated) DEVICE — DRAPE MEDIUM SHEET 40X70IN

## (undated) DEVICE — SYR 30CC LUER LOCK

## (undated) DEVICE — SYR 10CC LUER LOCK

## (undated) DEVICE — SYR W NDL BLN FILL 5ML 18GX1.5

## (undated) DEVICE — GLOVE 8 PROTEXIS PI BLUE

## (undated) DEVICE — STRAP POS KNEE BODY 4X60IN

## (undated) DEVICE — BANDAGE ADHESIVE FABRIC 2X4

## (undated) DEVICE — SYR DISP LL 5CC

## (undated) DEVICE — CONTRAST ISOVUE 300 50ML

## (undated) DEVICE — NDL HYPO REG 25G X 1 1/2